# Patient Record
Sex: FEMALE | URBAN - NONMETROPOLITAN AREA
[De-identification: names, ages, dates, MRNs, and addresses within clinical notes are randomized per-mention and may not be internally consistent; named-entity substitution may affect disease eponyms.]

---

## 2020-09-29 ENCOUNTER — APPOINTMENT (OUTPATIENT)
Dept: URBAN - NONMETROPOLITAN AREA CLINIC 54 | Age: 75
Setting detail: DERMATOLOGY
End: 2020-09-29

## 2020-09-29 PROCEDURE — OTHER MIPS QUALITY: OTHER

## 2020-09-30 ENCOUNTER — APPOINTMENT (OUTPATIENT)
Dept: URBAN - NONMETROPOLITAN AREA CLINIC 54 | Age: 75
Setting detail: DERMATOLOGY
End: 2020-09-30

## 2020-09-30 DIAGNOSIS — L27.0 GENERALIZED SKIN ERUPTION DUE TO DRUGS AND MEDICAMENTS TAKEN INTERNALLY: ICD-10-CM

## 2020-09-30 PROCEDURE — OTHER MIPS QUALITY: OTHER

## 2020-09-30 PROCEDURE — OTHER PRESCRIPTION: OTHER

## 2020-09-30 PROCEDURE — OTHER ADDITIONAL NOTES: OTHER

## 2020-09-30 PROCEDURE — 99214 OFFICE O/P EST MOD 30 MIN: CPT

## 2020-09-30 PROCEDURE — OTHER TREATMENT REGIMEN: OTHER

## 2020-09-30 PROCEDURE — OTHER COUNSELING: TOPICAL STEROIDS: OTHER

## 2020-09-30 RX ORDER — TRIAMCINOLONE ACETONIDE 1 MG/G
CREAM TOPICAL BID
Qty: 1 | Refills: 3 | Status: ERX | COMMUNITY
Start: 2020-09-30

## 2020-09-30 ASSESSMENT — LOCATION DETAILED DESCRIPTION DERM
LOCATION DETAILED: RIGHT ANTERIOR PROXIMAL THIGH
LOCATION DETAILED: EPIGASTRIC SKIN
LOCATION DETAILED: RIGHT ANTERIOR DISTAL UPPER ARM
LOCATION DETAILED: LEFT VENTRAL PROXIMAL FOREARM
LOCATION DETAILED: INFERIOR THORACIC SPINE
LOCATION DETAILED: LEFT ANTERIOR PROXIMAL THIGH

## 2020-09-30 ASSESSMENT — LOCATION ZONE DERM
LOCATION ZONE: TRUNK
LOCATION ZONE: ARM
LOCATION ZONE: LEG

## 2020-09-30 ASSESSMENT — LOCATION SIMPLE DESCRIPTION DERM
LOCATION SIMPLE: LEFT FOREARM
LOCATION SIMPLE: ABDOMEN
LOCATION SIMPLE: RIGHT THIGH
LOCATION SIMPLE: UPPER BACK
LOCATION SIMPLE: LEFT THIGH
LOCATION SIMPLE: RIGHT UPPER ARM

## 2020-09-30 ASSESSMENT — BSA RASH: BSA RASH: 60

## 2020-09-30 ASSESSMENT — SEVERITY ASSESSMENT: SEVERITY: MILD TO MODERATE

## 2020-09-30 ASSESSMENT — PAIN INTENSITY VAS: HOW INTENSE IS YOUR PAIN 0 BEING NO PAIN, 10 BEING THE MOST SEVERE PAIN POSSIBLE?: NO PAIN

## 2020-09-30 NOTE — PROCEDURE: MIPS QUALITY
Quality 47: Advance Care Plan: Advance Care Planning discussed and documented; advance care plan or surrogate decision maker documented in the medical record.
Quality 226: Preventive Care And Screening: Tobacco Use: Screening And Cessation Intervention: Patient screened for tobacco use and is an ex/non-smoker
Quality 431: Preventive Care And Screening: Unhealthy Alcohol Use - Screening: Patient screened for unhealthy alcohol use using a single question and scores less than 2 times per year
Quality 110: Preventive Care And Screening: Influenza Immunization: Influenza Immunization Administered during Influenza season
Quality 111:Pneumonia Vaccination Status For Older Adults: Pneumococcal Vaccination Administered
Detail Level: Detailed
Quality 130: Documentation Of Current Medications In The Medical Record: Current Medications Documented

## 2020-09-30 NOTE — PROCEDURE: TREATMENT REGIMEN
Detail Level: Zone
Initiate Treatment: triamcinolone acetonide 0.1 % topical cream BID\\nDays Supply: 30\\nSig: Apply to AA twice daily x 1 week, (mix equal parts with Cerave cream)

## 2020-09-30 NOTE — PROCEDURE: ADDITIONAL NOTES
Detail Level: Simple
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  She has stopped the medication.  Yesterday, she was given 80mg depo-medrol from PCP, and states that she fees some relief today.   \\n\\nInstructions to mix triamcinalone 0.1% cream with cetaphil lotion and apply QD-BID for one week to offer relief while waiting for the systemic effects of the depo-medrol to happen.   Pt and her daughter verbalize understanding and will follow up at this office on 10/6/20

## 2020-10-06 ENCOUNTER — APPOINTMENT (OUTPATIENT)
Dept: URBAN - NONMETROPOLITAN AREA CLINIC 54 | Age: 75
Setting detail: DERMATOLOGY
End: 2020-11-05

## 2020-10-06 DIAGNOSIS — D69.2 OTHER NONTHROMBOCYTOPENIC PURPURA: ICD-10-CM

## 2020-10-06 DIAGNOSIS — L27.0 GENERALIZED SKIN ERUPTION DUE TO DRUGS AND MEDICAMENTS TAKEN INTERNALLY: ICD-10-CM

## 2020-10-06 PROCEDURE — OTHER ADDITIONAL NOTES: OTHER

## 2020-10-06 PROCEDURE — OTHER TREATMENT REGIMEN: OTHER

## 2020-10-06 PROCEDURE — 99213 OFFICE O/P EST LOW 20 MIN: CPT

## 2020-10-06 PROCEDURE — OTHER NOTED ON EXAM BUT NOT TREATED: OTHER

## 2020-10-06 PROCEDURE — OTHER MIPS QUALITY: OTHER

## 2020-10-06 PROCEDURE — OTHER COUNSELING: TOPICAL STEROIDS: OTHER

## 2020-10-06 ASSESSMENT — LOCATION SIMPLE DESCRIPTION DERM
LOCATION SIMPLE: RIGHT UPPER ARM
LOCATION SIMPLE: LEFT FOREARM
LOCATION SIMPLE: RIGHT THIGH
LOCATION SIMPLE: LEFT THIGH
LOCATION SIMPLE: ABDOMEN
LOCATION SIMPLE: UPPER BACK

## 2020-10-06 ASSESSMENT — LOCATION DETAILED DESCRIPTION DERM
LOCATION DETAILED: LEFT ANTERIOR PROXIMAL THIGH
LOCATION DETAILED: INFERIOR THORACIC SPINE
LOCATION DETAILED: LEFT VENTRAL PROXIMAL FOREARM
LOCATION DETAILED: RIGHT ANTERIOR DISTAL UPPER ARM
LOCATION DETAILED: EPIGASTRIC SKIN
LOCATION DETAILED: RIGHT ANTERIOR PROXIMAL THIGH

## 2020-10-06 ASSESSMENT — LOCATION ZONE DERM
LOCATION ZONE: ARM
LOCATION ZONE: TRUNK
LOCATION ZONE: LEG

## 2020-10-06 NOTE — PROCEDURE: ADDITIONAL NOTES
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  She has stopped the medication.  Yesterday, she was given 80mg depo-medrol from PCP, and states that she fees some relief today.   \\n\\nInstructions to mix triamcinalone 0.1% cream with cetaphil lotion and apply QD-BID for one week to offer relief while waiting for the systemic effects of the depo-medrol to happen.   Pt and her daughter verbalize understanding and will follow up at this office on 10/6/20
Detail Level: Simple

## 2020-10-06 NOTE — PROCEDURE: MIPS QUALITY
Quality 47: Advance Care Plan: Advance Care Planning discussed and documented; advance care plan or surrogate decision maker documented in the medical record.
Quality 111:Pneumonia Vaccination Status For Older Adults: Pneumococcal Vaccination Administered
Quality 226: Preventive Care And Screening: Tobacco Use: Screening And Cessation Intervention: Patient screened for tobacco use and is an ex/non-smoker
Quality 130: Documentation Of Current Medications In The Medical Record: Current Medications Documented
Quality 431: Preventive Care And Screening: Unhealthy Alcohol Use - Screening: Patient screened for unhealthy alcohol use using a single question and scores less than 2 times per year
Quality 110: Preventive Care And Screening: Influenza Immunization: Influenza Immunization Administered during Influenza season
Detail Level: Detailed

## 2020-10-06 NOTE — PROCEDURE: TREATMENT REGIMEN
Detail Level: Zone
Continue Regimen: triamcinolone acetonide 0.1 % topical apply once daily x 1 week, decrease to every other day x 1 week then stop (mix equal parts with Cerave cream)